# Patient Record
Sex: FEMALE | Race: ASIAN | NOT HISPANIC OR LATINO | Employment: STUDENT | URBAN - METROPOLITAN AREA
[De-identification: names, ages, dates, MRNs, and addresses within clinical notes are randomized per-mention and may not be internally consistent; named-entity substitution may affect disease eponyms.]

---

## 2020-02-24 ENCOUNTER — HOSPITAL ENCOUNTER (EMERGENCY)
Facility: CLINIC | Age: 25
Discharge: HOME | End: 2020-02-24
Attending: EMERGENCY MEDICINE | Admitting: EMERGENCY MEDICINE
Payer: COMMERCIAL

## 2020-02-24 ENCOUNTER — APPOINTMENT (OUTPATIENT)
Dept: GENERAL RADIOLOGY | Facility: CLINIC | Age: 25
End: 2020-02-24
Attending: EMERGENCY MEDICINE
Payer: COMMERCIAL

## 2020-02-24 VITALS
BODY MASS INDEX: 21.19 KG/M2 | WEIGHT: 135 LBS | DIASTOLIC BLOOD PRESSURE: 62 MMHG | RESPIRATION RATE: 18 BRPM | TEMPERATURE: 98.3 F | HEART RATE: 69 BPM | HEIGHT: 67 IN | SYSTOLIC BLOOD PRESSURE: 102 MMHG | OXYGEN SATURATION: 100 %

## 2020-02-24 DIAGNOSIS — R07.9 CHEST PAIN, UNSPECIFIED TYPE: ICD-10-CM

## 2020-02-24 LAB
ALBUMIN SERPL-MCNC: 4.4 G/DL (ref 3.4–5)
ALP SERPL-CCNC: 59 U/L (ref 40–150)
ALT SERPL W P-5'-P-CCNC: 21 U/L (ref 0–50)
ANION GAP SERPL CALCULATED.3IONS-SCNC: <1 MMOL/L (ref 3–14)
AST SERPL W P-5'-P-CCNC: 13 U/L (ref 0–45)
BASOPHILS # BLD AUTO: 0 10E9/L (ref 0–0.2)
BASOPHILS NFR BLD AUTO: 0.3 %
BILIRUB SERPL-MCNC: 0.2 MG/DL (ref 0.2–1.3)
BUN SERPL-MCNC: 10 MG/DL (ref 7–30)
CALCIUM SERPL-MCNC: 9.1 MG/DL (ref 8.5–10.1)
CHLORIDE SERPL-SCNC: 110 MMOL/L (ref 94–109)
CO2 SERPL-SCNC: 30 MMOL/L (ref 20–32)
CREAT SERPL-MCNC: 0.65 MG/DL (ref 0.52–1.04)
DIFFERENTIAL METHOD BLD: NORMAL
EOSINOPHIL # BLD AUTO: 0.2 10E9/L (ref 0–0.7)
EOSINOPHIL NFR BLD AUTO: 2.7 %
ERYTHROCYTE [DISTWIDTH] IN BLOOD BY AUTOMATED COUNT: 12.9 % (ref 10–15)
GFR SERPL CREATININE-BSD FRML MDRD: >90 ML/MIN/{1.73_M2}
GLUCOSE SERPL-MCNC: 69 MG/DL (ref 70–99)
HCT VFR BLD AUTO: 40.2 % (ref 35–47)
HGB BLD-MCNC: 13.3 G/DL (ref 11.7–15.7)
IMM GRANULOCYTES # BLD: 0 10E9/L (ref 0–0.4)
IMM GRANULOCYTES NFR BLD: 0.3 %
INTERPRETATION ECG - MUSE: NORMAL
LYMPHOCYTES # BLD AUTO: 2.1 10E9/L (ref 0.8–5.3)
LYMPHOCYTES NFR BLD AUTO: 28.7 %
MCH RBC QN AUTO: 27.5 PG (ref 26.5–33)
MCHC RBC AUTO-ENTMCNC: 33.1 G/DL (ref 31.5–36.5)
MCV RBC AUTO: 83 FL (ref 78–100)
MONOCYTES # BLD AUTO: 0.6 10E9/L (ref 0–1.3)
MONOCYTES NFR BLD AUTO: 7.7 %
NEUTROPHILS # BLD AUTO: 4.4 10E9/L (ref 1.6–8.3)
NEUTROPHILS NFR BLD AUTO: 60.3 %
NRBC # BLD AUTO: 0 10*3/UL
NRBC BLD AUTO-RTO: 0 /100
PLATELET # BLD AUTO: 288 10E9/L (ref 150–450)
POTASSIUM SERPL-SCNC: 3.5 MMOL/L (ref 3.4–5.3)
PROT SERPL-MCNC: 8.4 G/DL (ref 6.8–8.8)
RBC # BLD AUTO: 4.83 10E12/L (ref 3.8–5.2)
SODIUM SERPL-SCNC: 140 MMOL/L (ref 133–144)
TROPONIN I SERPL-MCNC: <0.015 UG/L (ref 0–0.04)
WBC # BLD AUTO: 7.4 10E9/L (ref 4–11)

## 2020-02-24 PROCEDURE — 80053 COMPREHEN METABOLIC PANEL: CPT | Performed by: EMERGENCY MEDICINE

## 2020-02-24 PROCEDURE — 93005 ELECTROCARDIOGRAM TRACING: CPT

## 2020-02-24 PROCEDURE — 85025 COMPLETE CBC W/AUTO DIFF WBC: CPT | Performed by: EMERGENCY MEDICINE

## 2020-02-24 PROCEDURE — 71046 X-RAY EXAM CHEST 2 VIEWS: CPT

## 2020-02-24 PROCEDURE — 84484 ASSAY OF TROPONIN QUANT: CPT | Performed by: EMERGENCY MEDICINE

## 2020-02-24 PROCEDURE — 99285 EMERGENCY DEPT VISIT HI MDM: CPT | Mod: 25

## 2020-02-24 ASSESSMENT — MIFFLIN-ST. JEOR: SCORE: 1399.99

## 2020-02-24 NOTE — LETTER
Return to Work Release    Date: 2/24/2020      Name: Jacki Shrestha                       YOB: 1995    Medical Record Number: [id number]    The patient was seen at: [facility] ER     Restrictions if any: No Restrictions    Resume Activity: With no limitations. Jacki was evaluated and treated at [facility] on the above date. She was seen by Dr. Jey Hair. Jacki is cleared to return to full duty at work, immediately with no restrictions.        _________________________  .

## 2020-02-24 NOTE — ED AVS SNAPSHOT
Emergency Department  [address] [facility] [address]  Phone:  [phone]  Fax:  [phone]                                    Jacki Shrestha   MRN: [id number]    Department:   Emergency Department   Date of Visit:  2/24/2020           After Visit Summary Signature Page    I have received my discharge instructions, and my questions have been answered. I have discussed any challenges I see with this plan with the nurse or doctor.    ..........................................................................................................................................  Patient/Patient Representative Signature      ..........................................................................................................................................  Patient Representative Print Name and Relationship to Patient    ..................................................               ................................................  Date                                   Time    ..........................................................................................................................................  Reviewed by Signature/Title    ...................................................              ..............................................  Date                                               Time          22EPIC Rev 08/18

## 2020-02-24 NOTE — ED PROVIDER NOTES
"  History     Chief Complaint  Arm Pain    HPI   Jacki Shrestha is a 24 year old female who presents with left arm pain.   She states that son last Friday, 4 days prior to arrival, at 8:30 PM she began to have left-sided squeezing anterior chest wall pain that radiated into her left arm and down through her left wrist.  It went away after a few hours and when she woke up on Saturday she felt fine.  Two days later at approximately 8:30 PM Sunday night she began having left anterior chest wall pain again.  It went away as soon as she lay down to sleep but returned today at 11:30 AM and is present upon her arrival here in the ED. The pain is reproducible with palpation.  The pain goes away at rest.    She denies shortness of breath. No hemoptysis.  No history of previous symptoms.  No recent travel.  No leg swelling or pain.  No abdominal pain or vomiting.  No recent fevers.      Allergies:  No Known Drug Allergies    Medications:    Naproxen     Past Medical History:    Medical history reviewed. No pertinent medical history.    Past Surgical History:    Surgical history reviewed. No pertinent surgical history.    Family History:    Family history reviewed. No pertinent family history.     Social History:  Smoking Status: Never Smoker  Smokeless Tobacco: Never Used  Alcohol Use: Positive  Marital Status:  Single     Review of Systems  See the HPI, otherwise the rest of the ROS is negative.    Physical Exam     Patient Vitals for the past 24 hrs:   BP Temp Temp src Pulse Resp SpO2 Height Weight   02/24/20 1533 -- -- -- -- -- 100 % -- --   02/24/20 1532 102/62 -- -- 69 -- -- -- --   02/24/20 1236 108/69 98.3  F (36.8  C) Oral 90 18 100 % 1.702 m (5' 7\") 61.2 kg (135 lb)     General: Alert and Interactive.   Head: No signs of trauma.   Mouth/Throat: Oropharynx is clear and moist.   Eyes: Conjunctivae are normal. Pupils are equal, round, and reactive to light.   Neck: Normal range of motion. No nuchal rigidity.   CV: " Normal rate and regular rhythm.    Resp: Effort normal and breath sounds normal. No respiratory distress.   GI: Soft. There is no tenderness or guarding.   MSK: Normal range of motion. no edema. left anterior chest wall pain  Neuro: The patient is alert and oriented to person, place, and time.  PERRLA, EOMI, strength in upper/lower extremities normal and symmetrical.   Sensation normal. Speech normal.  GCS eye subscore is 4. GCS verbal subscore is 5. GCS motor subscore is 6.   Skin: Skin is warm and dry. No rash noted.   Psych: normal mood and affect. behavior is normal.       Emergency Department Course     ECG:  ECG taken at 1302, ECG read at 1310  Normal sinus rhythm with sinus arrhythmia  Rightward Axis   Borderline ECG  Rate 68 bpm. VT interval 162 ms. QRS duration 80 ms. QT/QTc 422/448 ms. P-R-T axes 52 95 69.    Imaging:  Radiology findings were communicated with the patient who voiced understanding of the findings.    XR Chest 2 Views   Final Result   IMPRESSION: No pleural fluid or pneumothorax. No airspace disease or   edema. Normal size of the heart.      LESTER J FAHRNER, MD        Laboratory:  Laboratory findings were communicated with the patient who voiced understanding of the findings.    Labs Ordered and Resulted from Time of ED Arrival Up to the Time of Departure from the ED   COMPREHENSIVE METABOLIC PANEL - Abnormal; Notable for the following components:       Result Value    Chloride 110 (*)     Anion Gap <1 (*)     Glucose 69 (*)     All other components within normal limits   CBC WITH PLATELETS DIFFERENTIAL   TROPONIN I   PULSE OXIMETRY NURSING   CARDIAC CONTINUOUS MONITORING       Emergency Department Course/Medical Decision Making:  Jacki Shrestha is a 24 year old female presented to the Emergency Department with a complaint of chest pain. Fortunately the workup in the ED has been unremarkable and at this time I am not concerned for ACS. The EKG shows no acute ischemic changes. The troponin  is negative. Given these findings, she is low risk for a major cardiac event at 6 weeks.     I considered other possible causes of chest pain including PE, infection, pneumothorax, aortic dissection, and even more benign causes such as reflux and esophageal motility issues. The physical exam, laboratory, and radiological findings listed above make life threatening conditions less likely. At this time I believe the patient is stable for discharge. I have encouraged close Primary Care Physician follow up.    Diagnosis:    ICD-10-CM    1. Chest pain, unspecified type R07.9      Disposition:   Discharge to home.  Followup with PMD    Scribe Disclosure:  I, Brittany Onofre, am serving as a scribe at 12:52 PM on 2/24/2020 to document services personally performed by Jey Hair MD based on my observations and the provider's statements to me.     EMERGENCY DEPARTMENT       Jey Hair MD  02/24/20 1958

## 2020-09-21 ENCOUNTER — ANCILLARY PROCEDURE (OUTPATIENT)
Dept: GENERAL RADIOLOGY | Facility: CLINIC | Age: 25
End: 2020-09-21
Attending: PHYSICIAN ASSISTANT
Payer: COMMERCIAL

## 2020-09-21 ENCOUNTER — OFFICE VISIT (OUTPATIENT)
Dept: FAMILY MEDICINE | Facility: CLINIC | Age: 25
End: 2020-09-21
Payer: COMMERCIAL

## 2020-09-21 VITALS
RESPIRATION RATE: 16 BRPM | HEART RATE: 67 BPM | BODY MASS INDEX: 23.34 KG/M2 | DIASTOLIC BLOOD PRESSURE: 62 MMHG | TEMPERATURE: 97.8 F | OXYGEN SATURATION: 100 % | SYSTOLIC BLOOD PRESSURE: 110 MMHG | WEIGHT: 149 LBS

## 2020-09-21 DIAGNOSIS — Z92.29 HISTORY OF BCG VACCINATION: ICD-10-CM

## 2020-09-21 DIAGNOSIS — R76.11 POSITIVE TB TEST: ICD-10-CM

## 2020-09-21 DIAGNOSIS — Z11.1 SCREENING EXAMINATION FOR PULMONARY TUBERCULOSIS: Primary | ICD-10-CM

## 2020-09-21 PROCEDURE — 99203 OFFICE O/P NEW LOW 30 MIN: CPT | Performed by: PHYSICIAN ASSISTANT

## 2020-09-21 PROCEDURE — 71046 X-RAY EXAM CHEST 2 VIEWS: CPT | Mod: FY

## 2020-09-21 NOTE — PATIENT INSTRUCTIONS
Patient Education     Tuberculosis Testing    Tuberculosis (TB) is a bacterial disease that spreads through the air. It can cause serious health problems. TB is on the rise. To protect your health, get tested.  Important  Whether you have active TB disease or simply test positive for TB infection, you must see a healthcare provider for an exam and treatment.   Who should be tested?  Anyone can be exposed to TB. But healthcare professionals, homeless people, and people coming from countries with high TB rates are more likely to be exposed to it. People such as elderly adults and people with HIV and AIDS are less able to fight off infections. They are also more likely to get TB. If you re at risk for exposure, get tested regularly.  TB test  The TB skin test tells you if the TB bacteria are in your body. Your healthcare provider puts a small amount of solution under the skin with a needle to see if a reaction occurs. Or a blood test may be done instead.  Getting your TB test results  Within 2 to 3 days after the test, or as told by your healthcare provider, you ll be asked to return to the office. Be sure to keep this appointment. Your test results will be looked at during this visit. In some cases, a second test may be done to confirm results.  What do the test results mean?    Negative results mean you likely don t have the TB bacteria in your body.    Positive results mean that you may have been infected with the TB bacteria. This doesn t necessarily mean you have active TB disease. You will need more tests, such as chest X-rays, to find out if you have TB disease.   Date Last Reviewed: 11/1/2016 2000-2019 [employer]. [address] [place] [address], [place] [address]. All rights reserved. This information is not intended as a substitute for professional medical care. Always follow your healthcare professional's instructions.

## 2020-09-21 NOTE — PROGRESS NOTES
Subjective     Jacki Shrestha is a 24 year old female who presents to clinic today for the following health issues:    HPI       Concern - positive BCG  Onset: last week  Description: pt states that she had a positive BCG and needs to have a chest xray done  Intensity: moderate  Progression of Symptoms:    Accompanying Signs & Symptoms:   Previous history of similar problem:   Precipitating factors:        Worsened by:   Alleviating factors:        Improved by:   Therapies tried and outcome:  none   Recent Labs   Lab Test 02/24/20  1336   ALT 21   CR 0.65   GFRESTIMATED >90   GFRESTBLACK >90   POTASSIUM 3.5      BP Readings from Last 3 Encounters:   09/21/20 110/62   02/24/20 102/62   03/23/14 106/56    Wt Readings from Last 3 Encounters:   09/21/20 67.6 kg (149 lb)   02/24/20 61.2 kg (135 lb)   09/17/14 51.3 kg (113 lb) (27 %, Z= -0.62)*     * Growth percentiles are based on CDC (Girls, 2-20 Years) data.                      Reviewed and updated as needed this visit by Provider  Tobacco  Allergies  Meds  Problems  Med Hx  Surg Hx  Fam Hx         Additional complaints: None    HPI additional notes: Jacki presents today with   Chief Complaint   Patient presents with     Orders     chest xray   History of bcg vaccination.  Needs cxr for tb screening.         Review of Systems   C: Negative for fever, chills, recent change in weight  Skin: Negative for worrisome rashes or lesions  ENT: Negative for ear, mouth and throat problems  Resp: Negative for significant cough or SOB  CV: Negative for chest pain or peripheral edema  GI: Negative for nausea, abdominal pain, heartburn, or change in bowel habits  MS: Negative for significant arthralgias or myalgias  P: Negative for changes in mood or affect  ROS all other systems negative.        Objective    /62   Pulse 67   Temp 97.8  F (36.6  C)   Resp 16   Wt 67.6 kg (149 lb)   SpO2 100%   BMI 23.34 kg/m    Body mass index is 23.34 kg/m .  Physical Exam    GENERAL: healthy, alert, in no acute distress  EYES: Grossly normal to inspection, EOMI, PERRL  HENT: Mucous mebranes moist.  NECK: Non-tender, no adenopathy.  RESP: lungs clear to auscultation - no rales, no rhonchi, no wheezes  SKIN: no suspicious lesions, no rashes  PSYCH: Alert and oriented times 3;  Able to articulate logical thoughts. Affect is normal.    Diagnostic test results:  Pending        Assessment & Plan         ICD-10-CM    1. Screening examination for pulmonary tuberculosis  Z11.1 XR Chest 2 Views   2. Positive TB test  R76.11    3. History of BCG vaccination  Z92.29 XR Chest 2 Views     Signed pt up for my chart so she can hopefully get her results today.  If positive, will have follow up for treatment.  Expect negative CXR due to bcg vaccination.    Please see patient instructions for treatment details.    Return in about 3 months (around 12/21/2020) for Annual Exam.    Susana Woodward PA-C  [facility]

## 2020-09-21 NOTE — RESULT ENCOUNTER NOTE
Mario Echeverria,    I just wanted to let you know that your lab results have been reviewed and are attached.    No evidence of tuberculosis or other pulmonary disease.    Please let me know if you have any questions and have a great week!    Sincerely,    Gabrielle Woodward PA-C    Family Medicine  [facility]  [address]  [place] [address]  [phone] (p)

## 2020-11-29 ENCOUNTER — HEALTH MAINTENANCE LETTER (OUTPATIENT)
Age: 25
End: 2020-11-29

## 2021-09-01 ENCOUNTER — HOSPITAL ENCOUNTER (EMERGENCY)
Facility: CLINIC | Age: 26
Discharge: HOME | End: 2021-09-01
Attending: EMERGENCY MEDICINE | Admitting: EMERGENCY MEDICINE
Payer: COMMERCIAL

## 2021-09-01 VITALS
RESPIRATION RATE: 16 BRPM | HEART RATE: 103 BPM | OXYGEN SATURATION: 100 % | SYSTOLIC BLOOD PRESSURE: 120 MMHG | TEMPERATURE: 97.8 F | BODY MASS INDEX: 25.69 KG/M2 | WEIGHT: 164 LBS | DIASTOLIC BLOOD PRESSURE: 65 MMHG

## 2021-09-01 DIAGNOSIS — R09.A2 GLOBUS PHARYNGEUS: ICD-10-CM

## 2021-09-01 PROCEDURE — 250N000013 HC RX MED GY IP 250 OP 250 PS 637: Performed by: EMERGENCY MEDICINE

## 2021-09-01 PROCEDURE — 250N000009 HC RX 250: Performed by: EMERGENCY MEDICINE

## 2021-09-01 PROCEDURE — 99283 EMERGENCY DEPT VISIT LOW MDM: CPT

## 2021-09-01 RX ADMIN — LIDOCAINE HYDROCHLORIDE 30 ML: 20 SOLUTION ORAL; TOPICAL at 22:14

## 2021-09-01 ASSESSMENT — ENCOUNTER SYMPTOMS
TROUBLE SWALLOWING: 0
SORE THROAT: 0

## 2021-09-02 NOTE — ED TRIAGE NOTES
pt states she believes she has a piece of avocado stuck in her throat - was able to drink water, but states she is coughing frequently and can still feel it stuck in her throat.

## 2021-09-02 NOTE — DISCHARGE INSTRUCTIONS
Please  an antiacid medication such as Prilosec or Pepcid and take it daily for the next 2 weeks.  Please follow-up with your doctor.

## 2021-09-02 NOTE — ED PROVIDER NOTES
History   Chief Complaint:  Swallowed Foreign Body    The history is provided by the patient.      Jacki Shrestha is a 25 year old female who presents with throat discomfort after swallowing a piece of avocado 1.5 hours ago. She believes it may still be stuck in her throat. Symptoms did not resolve after eating bread and frequently drinking water. Has been swallowing without difficulty.     Review of Systems   HENT: Negative for sore throat and trouble swallowing.    All other systems reviewed and are negative.    Allergies:  The patient has no known allergies.     Medications:  The patient is not currently taking any prescribed medications.    Past Medical History:     The patient denies past medical history.     Social History:  The patient was unaccompanied to the emergency department.    Physical Exam     Patient Vitals for the past 24 hrs:   BP Temp Temp src Pulse Resp SpO2 Weight   09/01/21 2030 120/65 97.8  F (36.6  C) Temporal 103 16 100 % 74.4 kg (164 lb)       Physical Exam  Constitutional: Well appearing.  HEENT: Atraumatic.  oropharynx normal appearance with no exudate, erythema, or moist mucous membranes.  Neck: Soft.  Supple.   Cardiac: Regular rate and rhythm.  No murmur or rub.  Respiratory: Clear to auscultation bilaterally.  No respiratory distress.  No wheezing, rhonchi, or rales.  Abdomen: Soft and nontender.  No rebound or guarding.  Nondistended.  Musculoskeletal: No edema.  Normal range of motion.  Neurologic: Alert and oriented .  Normal tone and bulk. Normal gait.  Skin: No rashes.  No edema.  Psych: Normal affect.  Normal behavior.    Emergency Department Course     Emergency Department Course:    Reviewed:  I reviewed vitals, past medical history and care everywhere    Assessments:  2203 I obtained history and examined the patient as noted above.   2315 I rechecked the patient and explained findings.    Interventions:  2214 Xylocaine & Maalox 30 mL PO    Disposition:  The patient was  discharged to home.     Impression & Plan   CMS Diagnoses: None    Medical Decision Making:  Jacki Shrestha is a 25-year-old woman who is afebrile and hemodynamically stable.  She is in no distress.  She is tolerating p.o. intake without difficulty.  She is given a GI cocktail with resolution of her symptoms.  Discussed likely globus sensation and plan for home with PPI and supportive care at home and discussed this will go away with time.  She is tolerating p.o. intake no evidence of esophageal foreign body.  She has no respiratory distress or hypoxia and is clear sounding lungs.  I see no indication for acute emergent imaging.  She is in agreement's plan her questions were answered.  We discussed supportive care at home and return precautions were given. She was in no distress at time of discharge.    Diagnosis:    ICD-10-CM    1. Globus pharyngeus  R09.89      Scribe Disclosure:  I, Marilee Ro, am serving as a scribe at 9:58 PM on 9/1/2021 to document services personally performed by Bennett Sloan MD based on my observations and the provider's statements to me.     Bennett Sloan MD  09/02/21 9579

## 2021-09-19 ENCOUNTER — HEALTH MAINTENANCE LETTER (OUTPATIENT)
Age: 26
End: 2021-09-19

## 2022-01-09 ENCOUNTER — HEALTH MAINTENANCE LETTER (OUTPATIENT)
Age: 27
End: 2022-01-09

## 2022-11-21 ENCOUNTER — HEALTH MAINTENANCE LETTER (OUTPATIENT)
Age: 27
End: 2022-11-21

## 2023-04-16 ENCOUNTER — HEALTH MAINTENANCE LETTER (OUTPATIENT)
Age: 28
End: 2023-04-16

## 2024-04-03 ENCOUNTER — APPOINTMENT (OUTPATIENT)
Dept: CT IMAGING | Facility: CLINIC | Age: 29
End: 2024-04-03
Attending: EMERGENCY MEDICINE
Payer: COMMERCIAL

## 2024-04-03 ENCOUNTER — HOSPITAL ENCOUNTER (EMERGENCY)
Facility: CLINIC | Age: 29
Discharge: HOME | End: 2024-04-03
Attending: EMERGENCY MEDICINE | Admitting: EMERGENCY MEDICINE
Payer: COMMERCIAL

## 2024-04-03 VITALS
HEIGHT: 66 IN | HEART RATE: 62 BPM | DIASTOLIC BLOOD PRESSURE: 67 MMHG | OXYGEN SATURATION: 100 % | SYSTOLIC BLOOD PRESSURE: 106 MMHG | RESPIRATION RATE: 16 BRPM | TEMPERATURE: 97.4 F | BODY MASS INDEX: 20.89 KG/M2 | WEIGHT: 130 LBS

## 2024-04-03 DIAGNOSIS — R10.9 LEFT FLANK PAIN: ICD-10-CM

## 2024-04-03 DIAGNOSIS — R10.12 LUQ ABDOMINAL PAIN: Primary | ICD-10-CM

## 2024-04-03 LAB
ALBUMIN SERPL BCG-MCNC: 4.5 G/DL (ref 3.5–5.2)
ALBUMIN UR-MCNC: NEGATIVE MG/DL
ALP SERPL-CCNC: 60 U/L (ref 40–150)
ALT SERPL W P-5'-P-CCNC: 11 U/L (ref 0–50)
ANION GAP SERPL CALCULATED.3IONS-SCNC: 14 MMOL/L (ref 7–15)
APPEARANCE UR: CLEAR
AST SERPL W P-5'-P-CCNC: 23 U/L (ref 0–45)
BILIRUB DIRECT SERPL-MCNC: <0.2 MG/DL (ref 0–0.3)
BILIRUB SERPL-MCNC: 0.2 MG/DL
BILIRUB UR QL STRIP: NEGATIVE
BUN SERPL-MCNC: 10.5 MG/DL (ref 6–20)
CALCIUM SERPL-MCNC: 9.6 MG/DL (ref 8.6–10)
CHLORIDE SERPL-SCNC: 102 MMOL/L (ref 98–107)
COLOR UR AUTO: NORMAL
CREAT SERPL-MCNC: 0.86 MG/DL (ref 0.51–0.95)
DEPRECATED HCO3 PLAS-SCNC: 23 MMOL/L (ref 22–29)
EGFRCR SERPLBLD CKD-EPI 2021: >90 ML/MIN/1.73M2
ERYTHROCYTE [DISTWIDTH] IN BLOOD BY AUTOMATED COUNT: 13.7 % (ref 10–15)
GLUCOSE SERPL-MCNC: 73 MG/DL (ref 70–99)
GLUCOSE UR STRIP-MCNC: NEGATIVE MG/DL
HCG UR QL: NEGATIVE
HCT VFR BLD AUTO: 39.3 % (ref 35–47)
HGB BLD-MCNC: 12.6 G/DL (ref 11.7–15.7)
HGB UR QL STRIP: NEGATIVE
KETONES UR STRIP-MCNC: NEGATIVE MG/DL
LEUKOCYTE ESTERASE UR QL STRIP: NEGATIVE
MCH RBC QN AUTO: 25.7 PG (ref 26.5–33)
MCHC RBC AUTO-ENTMCNC: 32.1 G/DL (ref 31.5–36.5)
MCV RBC AUTO: 80 FL (ref 78–100)
NITRATE UR QL: NEGATIVE
PH UR STRIP: 5.5 [PH] (ref 5–7)
PLATELET # BLD AUTO: 279 10E3/UL (ref 150–450)
POTASSIUM SERPL-SCNC: 3.4 MMOL/L (ref 3.4–5.3)
PROT SERPL-MCNC: 8.1 G/DL (ref 6.4–8.3)
RBC # BLD AUTO: 4.9 10E6/UL (ref 3.8–5.2)
RBC URINE: <1 /HPF
SODIUM SERPL-SCNC: 139 MMOL/L (ref 135–145)
SP GR UR STRIP: 1 (ref 1–1.03)
SQUAMOUS EPITHELIAL: 1 /HPF
UROBILINOGEN UR STRIP-MCNC: NORMAL MG/DL
WBC # BLD AUTO: 6.9 10E3/UL (ref 4–11)
WBC URINE: 1 /HPF

## 2024-04-03 PROCEDURE — 250N000011 HC RX IP 250 OP 636: Performed by: EMERGENCY MEDICINE

## 2024-04-03 PROCEDURE — 85027 COMPLETE CBC AUTOMATED: CPT | Performed by: EMERGENCY MEDICINE

## 2024-04-03 PROCEDURE — 82374 ASSAY BLOOD CARBON DIOXIDE: CPT | Performed by: EMERGENCY MEDICINE

## 2024-04-03 PROCEDURE — 82040 ASSAY OF SERUM ALBUMIN: CPT | Performed by: EMERGENCY MEDICINE

## 2024-04-03 PROCEDURE — 81025 URINE PREGNANCY TEST: CPT | Performed by: EMERGENCY MEDICINE

## 2024-04-03 PROCEDURE — 99285 EMERGENCY DEPT VISIT HI MDM: CPT | Mod: 25

## 2024-04-03 PROCEDURE — 81001 URINALYSIS AUTO W/SCOPE: CPT | Performed by: EMERGENCY MEDICINE

## 2024-04-03 PROCEDURE — 36415 COLL VENOUS BLD VENIPUNCTURE: CPT | Performed by: EMERGENCY MEDICINE

## 2024-04-03 PROCEDURE — 74177 CT ABD & PELVIS W/CONTRAST: CPT

## 2024-04-03 PROCEDURE — 250N000009 HC RX 250: Performed by: EMERGENCY MEDICINE

## 2024-04-03 RX ORDER — IOPAMIDOL 755 MG/ML
65 INJECTION, SOLUTION INTRAVASCULAR ONCE
Status: COMPLETED | OUTPATIENT
Start: 2024-04-03 | End: 2024-04-03

## 2024-04-03 RX ADMIN — SODIUM CHLORIDE 60 ML: 9 INJECTION, SOLUTION INTRAVENOUS at 15:01

## 2024-04-03 RX ADMIN — IOPAMIDOL 65 ML: 755 INJECTION, SOLUTION INTRAVENOUS at 15:00

## 2024-04-03 ASSESSMENT — ENCOUNTER SYMPTOMS
DIARRHEA: 0
CHILLS: 0
RHINORRHEA: 0
FEVER: 0
FLANK PAIN: 1
ABDOMINAL PAIN: 1
BACK PAIN: 0
COUGH: 0
DYSURIA: 0
NECK PAIN: 0
HEADACHES: 0
NAUSEA: 0
SHORTNESS OF BREATH: 0
HEMATURIA: 0
BLOOD IN STOOL: 0
VOMITING: 0

## 2024-04-03 ASSESSMENT — COLUMBIA-SUICIDE SEVERITY RATING SCALE - C-SSRS
6. HAVE YOU EVER DONE ANYTHING, STARTED TO DO ANYTHING, OR PREPARED TO DO ANYTHING TO END YOUR LIFE?: NO
1. IN THE PAST MONTH, HAVE YOU WISHED YOU WERE DEAD OR WISHED YOU COULD GO TO SLEEP AND NOT WAKE UP?: NO
2. HAVE YOU ACTUALLY HAD ANY THOUGHTS OF KILLING YOURSELF IN THE PAST MONTH?: NO

## 2024-04-03 ASSESSMENT — ACTIVITIES OF DAILY LIVING (ADL)
ADLS_ACUITY_SCORE: 35

## 2024-04-03 NOTE — ED TRIAGE NOTES
Left flank pain wraps into LLQ abdomen since Friday, no nausea vomiting, no urinary symptoms.      Triage Assessment (Adult)       Row Name 04/03/24 1115          Triage Assessment    Airway WDL WDL        Respiratory WDL    Respiratory WDL WDL        Skin Circulation/Temperature WDL    Skin Circulation/Temperature WDL WDL        Cardiac WDL    Cardiac WDL WDL        Peripheral/Neurovascular WDL    Peripheral Neurovascular WDL WDL        Cognitive/Neuro/Behavioral WDL    Cognitive/Neuro/Behavioral WDL WDL

## 2024-04-03 NOTE — ED PROVIDER NOTES
"  History     Chief Complaint:  Abdominal Pain       HPI   Jacki Shrestha is a 28 year old female presents to the emergency department with with intermittent left flank pain/left upper quadrant abdominal pain since this past Friday.  Patient reports that the pain is improved at times with sitting upright, but worsened with laying down.  No prior history of abdominal surgery.  Patient reports that the pain is sharp in nature.  No radiation of this pain.  Patient reports that pain is at worst a 10 out of 10 last night and she was unable to sleep.  Patient otherwise has no other associated symptoms.  No prior history of similar pain.  Denies any dysuria, hematuria, vaginal bleeding, vaginal discharge, or changes in bowel movements.    Review of Systems   Constitutional:  Negative for chills and fever.   HENT:  Negative for congestion and rhinorrhea.    Respiratory:  Negative for cough and shortness of breath.    Cardiovascular:  Negative for chest pain.   Gastrointestinal:  Positive for abdominal pain. Negative for blood in stool, diarrhea, nausea and vomiting.   Genitourinary:  Positive for flank pain. Negative for dysuria, hematuria, vaginal bleeding and vaginal discharge.   Musculoskeletal:  Negative for back pain and neck pain.   Neurological:  Negative for headaches.     Independent Historian:   None - Patient Only    Review of External Notes:   10/20/2022 ED visit note    Medications:    None    Past Medical History:    None    Past Surgical History:    None     Physical Exam   Patient Vitals for the past 24 hrs:   BP Temp Temp src Pulse Resp SpO2 Height Weight   04/03/24 1600 106/67 -- -- 62 16 100 % -- --   04/03/24 1518 -- -- -- -- -- 100 % -- --   04/03/24 1515 -- -- -- -- -- 100 % -- --   04/03/24 1358 98/51 -- -- -- -- 100 % -- --   04/03/24 1343 101/64 -- -- 60 -- 100 % -- --   04/03/24 1341 101/64 -- -- 60 -- 100 % -- --   04/03/24 1107 114/49 97.4  F (36.3  C) Temporal 68 16 99 % 1.676 m (5' 6\") 59 kg " (130 lb)      Constitutional:       General: Not in acute distress.        Appearance: Normal appearance.   HENT:      Head: Normocephalic and atraumatic.   Eyes:      Extraocular Movements: Extraocular movements intact.      Conjunctiva/sclera: Conjunctivae normal.   Cardiovascular:      Rate and Rhythm: Normal rate and regular rhythm.   Pulmonary:      Effort: Pulmonary effort is normal. No respiratory distress.      Breath sounds: Normal breath sounds.   Abdominal:      General: Abdomen is flat. There is no distension.      Palpations: Abdomen is soft.      Tenderness: There is mild left sided upper quadrant/flank abdominal tenderness to palpation.  No CVA tenderness.  Musculoskeletal:      Cervical back: Normal range of motion. No rigidity.      Right lower leg: No edema.      Left lower leg: No edema.   Skin:     General: Skin is warm and dry.   Neurological:      General: No focal deficit present.      Mental Status: Alert and oriented to person, place, and time.   Psychiatric:         Mood and Affect: Mood normal.         Behavior: Behavior normal.    Emergency Department Course     Imaging:  CT Abdomen Pelvis w Contrast   Final Result   IMPRESSION:    1.  No visualized explanation for patient's abdominal pain.      MEDARDO ALEXANDER MD            SYSTEM ID:  [id number]         Report per radiology    Laboratory:  Labs Ordered and Resulted from Time of ED Arrival to Time of ED Departure   CBC WITH PLATELETS - Abnormal       Result Value    WBC Count 6.9      RBC Count 4.90      Hemoglobin 12.6      Hematocrit 39.3      MCV 80      MCH 25.7 (*)     MCHC 32.1      RDW 13.7      Platelet Count 279     BASIC METABOLIC PANEL - Normal    Sodium 139      Potassium 3.4      Chloride 102      Carbon Dioxide (CO2) 23      Anion Gap 14      Urea Nitrogen 10.5      Creatinine 0.86      GFR Estimate >90      Calcium 9.6      Glucose 73     HCG QUALITATIVE URINE - Normal    hCG Urine Qualitative Negative     ROUTINE UA WITH  MICROSCOPIC REFLEX TO CULTURE - Normal    Color Urine Straw      Appearance Urine Clear      Glucose Urine Negative      Bilirubin Urine Negative      Ketones Urine Negative      Specific Gravity Urine 1.003      Blood Urine Negative      pH Urine 5.5      Protein Albumin Urine Negative      Urobilinogen Urine Normal      Nitrite Urine Negative      Leukocyte Esterase Urine Negative      RBC Urine <1      WBC Urine 1      Squamous Epithelials Urine 1     HEPATIC FUNCTION PANEL - Normal    Protein Total 8.1      Albumin 4.5      Bilirubin Total 0.2      Alkaline Phosphatase 60      AST 23      ALT 11      Bilirubin Direct <0.20          Emergency Department Course & Assessments:       Interventions:  Medications   iopamidol (ISOVUE-370) solution 65 mL (65 mLs Intravenous $Given 4/3/24 1500)   Saline Flush (60 mLs Intravenous $Given 4/3/24 1501)        Independent Interpretation (X-rays, CTs, rhythm strip):  None    Assessments/Consultations/Discussion of Management or Tests:  ED Course as of 24 1757      1540 CT Abdomen Pelvis w Contrast  No acute findings.   1600 On reevaluation, patient sitting in bed in no acute distress.  Still having some mild left-sided abdominal aching and it is tender to palpation.  Suspect potentially musculoskeletal in nature.  Patient reports that her symptoms started on Friday just before she started doing dead lifts and does not believe she injured herself from there, but she does endorse that she works out a lot.  Advised trial of ice pack, Tylenol, ibuprofen, and over-the-counter pain patches.  Return to the ER if symptoms worsen.  Discussed return precautions.  Answered all questions.  Patient voiced understanding and agreement with plan.       Social Determinants of Health affecting care:   None    Disposition:  The patient was discharged to home.     Impression & Plan    CMS Diagnoses: None    MIPS (If applicable):  N/A    Medical Decision Makin-year-old  female as described above presents to the emergency department with left upper quadrant/left flank pain.  Patient hemodynamically stable at time evaluation.  Afebrile.  At time of evaluation, pain has since resolved.  However, patient reports that at worst, pain is 10 out of 10.  No prior similar history.  Differential diagnosis considered includes, but not limited to, splenomegaly, nephrolithiasis/ureteral colic, pyelonephritis, infectious colitis, diverticulitis, bowel obstruction, and abdominal aortic aneurysm/dissection.  Low suspicion for ovarian torsion/tubo-ovarian abscess as symptoms appears to be more upper abdomen/left leg in nature.  No shortness of breath or chest pain to suggest left lower lung PE.  Abdominal pain lab work ordered.  Will obtain CT abdomen pelvis with contrast for evaluation of above discussed differentials.  Discussed care plan with patient who voiced understanding and agreement with plan.  Answered all questions.  Additional workup and orders as listed in chart.    Please refer to ED course above as part of continuation of MDM for details on the patient's treatment course and any changes or updates in care plan beyond my initial evaluation and MDM creation.      Diagnosis:    ICD-10-CM    1. LUQ abdominal pain  R10.12       2. Left flank pain  R10.9            Discharge Medications:  There are no discharge medications for this patient.         STEPHIE TYLER DO  4/3/2024   Stephie Tyler DO Yeh, [place], DO  04/03/24 1757

## 2024-06-23 ENCOUNTER — HEALTH MAINTENANCE LETTER (OUTPATIENT)
Age: 29
End: 2024-06-23

## 2024-12-27 ENCOUNTER — HOSPITAL ENCOUNTER (EMERGENCY)
Facility: CLINIC | Age: 29
Discharge: HOME | End: 2024-12-27
Attending: EMERGENCY MEDICINE | Admitting: EMERGENCY MEDICINE
Payer: COMMERCIAL

## 2024-12-27 ENCOUNTER — APPOINTMENT (OUTPATIENT)
Dept: GENERAL RADIOLOGY | Facility: CLINIC | Age: 29
End: 2024-12-27
Attending: EMERGENCY MEDICINE
Payer: COMMERCIAL

## 2024-12-27 VITALS
TEMPERATURE: 97 F | SYSTOLIC BLOOD PRESSURE: 116 MMHG | BODY MASS INDEX: 26.52 KG/M2 | OXYGEN SATURATION: 97 % | HEART RATE: 80 BPM | RESPIRATION RATE: 16 BRPM | HEIGHT: 66 IN | DIASTOLIC BLOOD PRESSURE: 77 MMHG | WEIGHT: 165 LBS
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
## 2024-12-27 DIAGNOSIS — S70.01XA CONTUSION OF RIGHT HIP, INITIAL ENCOUNTER: ICD-10-CM

## 2024-12-27 DIAGNOSIS — S20.211A CHEST WALL CONTUSION, RIGHT, INITIAL ENCOUNTER: ICD-10-CM

## 2024-12-27 DIAGNOSIS — S40.011A CONTUSION OF RIGHT SHOULDER, INITIAL ENCOUNTER: ICD-10-CM

## 2024-12-27 PROCEDURE — 73030 X-RAY EXAM OF SHOULDER: CPT | Mod: RT

## 2024-12-27 PROCEDURE — 99283 EMERGENCY DEPT VISIT LOW MDM: CPT

## 2024-12-27 RX ORDER — ACETAMINOPHEN 500 MG
1000 TABLET ORAL EVERY 6 HOURS PRN
COMMUNITY
Start: 2024-12-27

## 2024-12-27 RX ORDER — IBUPROFEN 600 MG/1
600 TABLET, FILM COATED ORAL EVERY 6 HOURS PRN
COMMUNITY
Start: 2024-12-27

## 2024-12-27 ASSESSMENT — ACTIVITIES OF DAILY LIVING (ADL)
ADLS_ACUITY_SCORE: 41
ADLS_ACUITY_SCORE: 41

## 2024-12-27 NOTE — ED PROVIDER NOTES
"  Emergency Department Note      History of Present Illness     Chief Complaint   Fall and Arm Pain    HPI   Jacki Shrestha is a 29 year old female who presents to the ED for arm pain. On 12/25, around 0330, the patient was on her way to the bathroom when she fell down approximately 5 stairs. She has been experiencing right shoulder pain that will occasionally radiate to her fingers. She is unable to lift her arm above her head. She also endorses right rib, right hip, and neck pain. Her neck pain will occasionally radiate up into her head. She states her hip pain feels like a soreness. She has had her right hip and neck adjusted by a chiropractor. She denies right elbow, wrist, or hand pain.    Independent Historian   None    Review of External Notes   None    Past Medical History   Medical History and Problem List   No other significant past medical history or family history.    Medications  The patient is currently on no regular medications.    Physical Exam   Patient Vitals for the past 24 hrs:   BP Temp Temp src Pulse Resp SpO2 Height Weight   12/27/24 0826 116/77 97  F (36.1  C) Temporal 80 16 97 % 1.676 m (5' 6\") 74.8 kg (165 lb)     Physical Exam  General: Alert, appears well-developed and well-nourished. Cooperative.     In mild distress  HEENT:  Head:  Atraumatic  Ears:  External ears are normal  Mouth/Throat:  Oropharynx is without erythema or exudate and mucous membranes are moist.   Eyes:   Conjunctivae normal and EOM are normal. No scleral icterus.  CV:  Normal rate, regular rhythm, normal heart sounds and radial pulses are 2+ and symmetric.  No murmur.  Resp:  Breath sounds are clear bilaterally    Non-labored, no retractions or accessory muscle use  GI:  Abdomen is soft, no distension, no tenderness. No rebound or guarding.  No CVA tenderness bilaterally  MS:  Normal range of motion. No edema.    Normal strength in all 4 extremities.     Back atraumatic.    No midline cervical, thoracic, or lumbar " tenderness    Mild chest wall tenderness to anterior inferior right chest wall, no step offs or crepitus.     Right Shoulder:    The Clavicle is intact clinically    The AC joint is non-tender    The Glenohumeral Joint is intact    No dislocation is palpated/appreciated clinically    Limited abduction of right upper extremity due to pain.  Unable to full flex upper arm.     The proximal humerus is mildly tender    The mid and distal shaft of the humerus are non-tender    Axillary nerve function is intact    Brachial and Radial Artery pulse is normal    Median, Ulnar, and Radial Nerve function distally are intact    Right Hip:    There is normal ROM of the hip    Flexion and Extension of the hip is normal    There is no evidence of clinical dislocation    There is no hematoma or obvious bursitis    The femur appears normal and without pain    There is no hematoma to the thigh    Sensory and Motor exam to the thigh and distal leg are normal    The knee, shin, ankle, and foot are without pain    Normal distal pulses are detected  Skin:  Warm and dry.  No rash or lesions noted.  Neuro:   Alert. Normal strength.  GCS: 15  Psych: Normal mood and affect.    Diagnostics   Lab Results   Labs Ordered and Resulted from Time of ED Arrival to Time of ED Departure - No data to display    Imaging   XR Shoulder Right G/E 3 Views   Final Result   IMPRESSION: Normal joint spaces and alignment. No fracture.      MEENU ESATON MD            SYSTEM ID:  [id number]        Independent Interpretation   X-ray right shoulder shows no fracture or dislocation    ED Course    Medications Administered   Medications - No data to display    Procedures   Procedures     Discussion of Management   None    ED Course   ED Course as of 12/27/24 1320   Fri Dec 27, 2024   1002 I obtained history and examined the patient as noted above.       Additional Documentation  None    Medical Decision Making / Diagnosis   CMS Diagnoses: None    MIPS        None    [facility]   Jacki Shrestha is a 29 year old female who presents 2 days after falling down 5 stairs at home in the middle of the night.  She has right shoulder and upper arm pain as well as right chest wall discomfort and right buttock pain.  I suspect symptoms are related to contusions of the shoulder, chest wall, and hip/buttock.  There is no evidence of bruising on examination.  She does have slightly limited range of abduction of the right upper extremity given pain in the deltoid overlying the humerus but thankfully no active pain within the joint of the right shoulder at this time.  She has no clavicle tenderness.  X-ray of the right shoulder shows no evidence of fracture or dislocation.  Patient has some mild tenderness to the chest wall but low suspicion for rib fracture that would necessitate x-ray or advanced CT imaging of the chest at this time.  She is not hypoxic and denies any active chest discomfort or shortness of breath.  Patient has full range of motion and weightbearing status of the right lower extremity and so low concern for hip fracture or dislocation.  She does have pain in the right buttock and I suspect likely related to contusion.  Encouraged to take Tylenol and ibuprofen for pain as she has not been using any medications at this time.  She has been going to a chiropractor for adjustments which has not been overly helpful.  Encouraged to follow-up with an orthopedic doctor in the next 1 to 2 weeks for recheck particularly if the right shoulder pain persist that she may require advanced MRI imaging of the shoulder at some point in the future.  After return precautions understood and all questions answered, discharged home.    Disposition   The patient was discharged.     Diagnosis     ICD-10-CM    1. Fall, initial encounter  W19.XXXA       2. Contusion of right shoulder, initial encounter  S40.011A       3. Contusion of right hip, initial encounter  S70.01XA       4. Chest wall  contusion, right, initial encounter  S20.211A            Discharge Medications   Discharge Medication List as of 12/27/2024 10:10 AM        START taking these medications    Details   acetaminophen (TYLENOL) 500 MG tablet Take 2 tablets (1,000 mg) by mouth every 6 hours as needed for pain., OTC      ibuprofen (ADVIL/MOTRIN) 600 MG tablet Take 1 tablet (600 mg) by mouth every 6 hours as needed for moderate pain., OTC           Scribe Disclosure:  I, Harsh Garza, am serving as a scribe at 10:16 AM on 12/27/2024 to document services personally performed by Simon Michelle MD based on my observations and the provider's statements to me.        Simon Michelle MD  12/27/24 [id number]

## 2025-08-02 ENCOUNTER — HEALTH MAINTENANCE LETTER (OUTPATIENT)
Age: 30
End: 2025-08-02